# Patient Record
Sex: FEMALE | Race: WHITE | NOT HISPANIC OR LATINO | URBAN - METROPOLITAN AREA
[De-identification: names, ages, dates, MRNs, and addresses within clinical notes are randomized per-mention and may not be internally consistent; named-entity substitution may affect disease eponyms.]

---

## 2017-10-14 ENCOUNTER — EMERGENCY (EMERGENCY)
Facility: HOSPITAL | Age: 22
LOS: 1 days | Discharge: HOME | End: 2017-10-14

## 2017-10-14 DIAGNOSIS — J06.9 ACUTE UPPER RESPIRATORY INFECTION, UNSPECIFIED: ICD-10-CM

## 2017-10-14 DIAGNOSIS — J30.2 OTHER SEASONAL ALLERGIC RHINITIS: ICD-10-CM

## 2017-10-14 DIAGNOSIS — Z3A.01 LESS THAN 8 WEEKS GESTATION OF PREGNANCY: ICD-10-CM

## 2017-10-14 DIAGNOSIS — O99.511 DISEASES OF THE RESPIRATORY SYSTEM COMPLICATING PREGNANCY, FIRST TRIMESTER: ICD-10-CM

## 2018-06-18 ENCOUNTER — INPATIENT (INPATIENT)
Facility: HOSPITAL | Age: 23
LOS: 1 days | Discharge: HOME | End: 2018-06-20
Attending: OBSTETRICS & GYNECOLOGY | Admitting: OBSTETRICS & GYNECOLOGY

## 2018-06-18 VITALS
TEMPERATURE: 103 F | DIASTOLIC BLOOD PRESSURE: 73 MMHG | OXYGEN SATURATION: 97 % | RESPIRATION RATE: 18 BRPM | SYSTOLIC BLOOD PRESSURE: 120 MMHG | HEART RATE: 137 BPM

## 2018-06-18 LAB
ANION GAP SERPL CALC-SCNC: 14 MMOL/L — SIGNIFICANT CHANGE UP (ref 7–14)
APPEARANCE UR: CLEAR — SIGNIFICANT CHANGE UP
BACTERIA # UR AUTO: (no result) /HPF
BASOPHILS # BLD AUTO: 0.03 K/UL — SIGNIFICANT CHANGE UP (ref 0–0.2)
BASOPHILS NFR BLD AUTO: 0.2 % — SIGNIFICANT CHANGE UP (ref 0–1)
BILIRUB UR-MCNC: NEGATIVE — SIGNIFICANT CHANGE UP
BUN SERPL-MCNC: 9 MG/DL — LOW (ref 10–20)
CALCIUM SERPL-MCNC: 9.2 MG/DL — SIGNIFICANT CHANGE UP (ref 8.5–10.1)
CHLORIDE SERPL-SCNC: 98 MMOL/L — SIGNIFICANT CHANGE UP (ref 98–110)
CO2 SERPL-SCNC: 25 MMOL/L — SIGNIFICANT CHANGE UP (ref 17–32)
COLOR SPEC: YELLOW — SIGNIFICANT CHANGE UP
CREAT SERPL-MCNC: 0.8 MG/DL — SIGNIFICANT CHANGE UP (ref 0.7–1.5)
DIFF PNL FLD: (no result)
EOSINOPHIL # BLD AUTO: 0.16 K/UL — SIGNIFICANT CHANGE UP (ref 0–0.7)
EOSINOPHIL NFR BLD AUTO: 1.2 % — SIGNIFICANT CHANGE UP (ref 0–8)
EPI CELLS # UR: (no result) /HPF
GLUCOSE SERPL-MCNC: 113 MG/DL — HIGH (ref 70–99)
GLUCOSE UR QL: NEGATIVE MG/DL — SIGNIFICANT CHANGE UP
HCT VFR BLD CALC: 38.1 % — SIGNIFICANT CHANGE UP (ref 37–47)
HGB BLD-MCNC: 11.8 G/DL — LOW (ref 12–16)
IMM GRANULOCYTES NFR BLD AUTO: 0.3 % — SIGNIFICANT CHANGE UP (ref 0.1–0.3)
KETONES UR-MCNC: NEGATIVE — SIGNIFICANT CHANGE UP
LACTATE SERPL-SCNC: 1.2 MMOL/L — SIGNIFICANT CHANGE UP (ref 0.5–2.2)
LEUKOCYTE ESTERASE UR-ACNC: (no result)
LYMPHOCYTES # BLD AUTO: 0.88 K/UL — LOW (ref 1.2–3.4)
LYMPHOCYTES # BLD AUTO: 6.4 % — LOW (ref 20.5–51.1)
MCHC RBC-ENTMCNC: 23.2 PG — LOW (ref 27–31)
MCHC RBC-ENTMCNC: 31 G/DL — LOW (ref 32–37)
MCV RBC AUTO: 74.9 FL — LOW (ref 81–99)
MONOCYTES # BLD AUTO: 0.43 K/UL — SIGNIFICANT CHANGE UP (ref 0.1–0.6)
MONOCYTES NFR BLD AUTO: 3.1 % — SIGNIFICANT CHANGE UP (ref 1.7–9.3)
NEUTROPHILS # BLD AUTO: 12.14 K/UL — HIGH (ref 1.4–6.5)
NEUTROPHILS NFR BLD AUTO: 88.8 % — HIGH (ref 42.2–75.2)
NITRITE UR-MCNC: NEGATIVE — SIGNIFICANT CHANGE UP
NRBC # BLD: 0 /100 WBCS — SIGNIFICANT CHANGE UP (ref 0–0)
PH UR: 6.5 — SIGNIFICANT CHANGE UP (ref 5–8)
PLATELET # BLD AUTO: 266 K/UL — SIGNIFICANT CHANGE UP (ref 130–400)
POTASSIUM SERPL-MCNC: 3.9 MMOL/L — SIGNIFICANT CHANGE UP (ref 3.5–5)
POTASSIUM SERPL-SCNC: 3.9 MMOL/L — SIGNIFICANT CHANGE UP (ref 3.5–5)
PROT UR-MCNC: NEGATIVE MG/DL — SIGNIFICANT CHANGE UP
RBC # BLD: 5.09 M/UL — SIGNIFICANT CHANGE UP (ref 4.2–5.4)
RBC # FLD: 15.6 % — HIGH (ref 11.5–14.5)
RBC CASTS # UR COMP ASSIST: (no result) /HPF
SODIUM SERPL-SCNC: 137 MMOL/L — SIGNIFICANT CHANGE UP (ref 135–146)
SP GR SPEC: 1.01 — SIGNIFICANT CHANGE UP (ref 1.01–1.03)
UROBILINOGEN FLD QL: 0.2 MG/DL — SIGNIFICANT CHANGE UP (ref 0.2–0.2)
WBC # BLD: 13.68 K/UL — HIGH (ref 4.8–10.8)
WBC # FLD AUTO: 13.68 K/UL — HIGH (ref 4.8–10.8)
WBC UR QL: (no result) /HPF

## 2018-06-18 RX ORDER — AMPICILLIN TRIHYDRATE 250 MG
2 CAPSULE ORAL EVERY 6 HOURS
Qty: 0 | Refills: 0 | Status: DISCONTINUED | OUTPATIENT
Start: 2018-06-18 | End: 2018-06-20

## 2018-06-18 RX ORDER — GENTAMICIN SULFATE 40 MG/ML
80 VIAL (ML) INJECTION EVERY 8 HOURS
Qty: 0 | Refills: 0 | Status: DISCONTINUED | OUTPATIENT
Start: 2018-06-18 | End: 2018-06-20

## 2018-06-18 RX ORDER — SODIUM CHLORIDE 9 MG/ML
1000 INJECTION INTRAMUSCULAR; INTRAVENOUS; SUBCUTANEOUS ONCE
Qty: 0 | Refills: 0 | Status: COMPLETED | OUTPATIENT
Start: 2018-06-18 | End: 2018-06-18

## 2018-06-18 RX ORDER — IBUPROFEN 200 MG
600 TABLET ORAL EVERY 6 HOURS
Qty: 0 | Refills: 0 | Status: DISCONTINUED | OUTPATIENT
Start: 2018-06-18 | End: 2018-06-20

## 2018-06-18 RX ORDER — ACETAMINOPHEN 500 MG
650 TABLET ORAL EVERY 6 HOURS
Qty: 0 | Refills: 0 | Status: DISCONTINUED | OUTPATIENT
Start: 2018-06-18 | End: 2018-06-20

## 2018-06-18 RX ADMIN — Medication 100 MILLIGRAM(S): at 18:01

## 2018-06-18 RX ADMIN — Medication 100 MILLIGRAM(S): at 22:00

## 2018-06-18 RX ADMIN — Medication 650 MILLIGRAM(S): at 12:35

## 2018-06-18 RX ADMIN — Medication 216 GRAM(S): at 18:01

## 2018-06-18 RX ADMIN — Medication 650 MILLIGRAM(S): at 22:03

## 2018-06-18 RX ADMIN — SODIUM CHLORIDE 1000 MILLILITER(S): 9 INJECTION INTRAMUSCULAR; INTRAVENOUS; SUBCUTANEOUS at 13:27

## 2018-06-18 RX ADMIN — SODIUM CHLORIDE 1000 MILLILITER(S): 9 INJECTION INTRAMUSCULAR; INTRAVENOUS; SUBCUTANEOUS at 14:57

## 2018-06-18 RX ADMIN — Medication 100 MILLIGRAM(S): at 23:41

## 2018-06-18 NOTE — H&P ADULT - NSHPPHYSICALEXAM_GEN_ALL_CORE
Vital Signs Last 24 Hrs  T(C): 37 (18 Jun 2018 14:26), Max: 39.3 (18 Jun 2018 12:26)  T(F): 98.6 (18 Jun 2018 14:26), Max: 102.7 (18 Jun 2018 12:26)  HR: 98 (18 Jun 2018 14:26) (98 - 137)  BP: 109/59 (18 Jun 2018 14:26) (109/59 - 120/73)  RR: 18 (18 Jun 2018 14:26) (18 - 18)  SpO2: 97% (18 Jun 2018 14:26) (97% - 97%)    PHYSICAL EXAM:  GEN: NAD, AAOX3  breasts: bilateral tenderness, no engorgement or erythema, no nipple cracks  CVS: RRR, normal S1/S2  lungs: CTAB  abd: soft, nontender, no r/g/r  SVE: normal external genitalia, no vaginal bleeding or discharge, closed cervix, no CMT, anteverted uterus 8 cm in size, mild fundal tenderness, no adnexal masses or tenderness.   ext: no calf tenderness or edema

## 2018-06-18 NOTE — H&P ADULT - NSHPLABSRESULTS_GEN_ALL_CORE
11.8   13.68 )-----------( 266      ( 2018 13:09 )             38.1       06-18    137  |  98  |  9<L>  ----------------------------<  113<H>  3.9   |  25  |  0.8    Ca    9.2      2018 13:09        Urinalysis Basic - ( 2018 13:55 )    Color: Yellow / Appearance: Clear / S.010 / pH: x  Gluc: x / Ketone: Negative  / Bili: Negative / Urobili: 0.2 mg/dL   Blood: x / Protein: Negative mg/dL / Nitrite: Negative   Leuk Esterase: Large / RBC: 2-5 /HPF / WBC 10-25 /HPF   Sq Epi: x / Non Sq Epi: Few /HPF / Bacteria: Few /HPF    CXR negative

## 2018-06-18 NOTE — ED PROVIDER NOTE - NS ED ROS FT
CONST: +Fever and body aches  EYES: No pain, redness, drainage or visual changes.  ENT: No ear pain or discharge, nasal discharge or congestion. No sore throat  CARD: No chest pain, palpitations  RESP: No SOB, cough, hemoptysis. No hx of asthma or COPD  BREAST: +BL breast pain  GI: No abdominal pain, N/V/D  MS: No joint pain, back pain or extremity pain/injury  SKIN: No rashes  NEURO: No headache, dizziness, paresthesias or LOC

## 2018-06-18 NOTE — H&P ADULT - HISTORY OF PRESENT ILLNESS
HPI: 22 yo P1 s/p  on 18 at [facility] with Dr. Lyman presents to [facility] with subjective fevers of 102 and body aches for 24 hours. Patient indicates she was not feeling well and starting having chills yesterday a/w body aches that worsened and today felt warm and temperature taken at home was 104. She did not take any medications. For the last 1 weeks she has had intermittent dysuria when urinarting, denies urgency or frequency. Denies nausea, vomiting, diarrhea, confusion, CP, SOB, abdominal pain, vaginal discharge. Has some vaginal spotting, non foul smelling. Denies sick contacts. She also c/o bilateral breast pain that started since the vaginal delivery that worsened yesterday, worse on the left breast than right, a/w swelling, but no nipple discharge, no nipple bleeding, or focal tenderness. She reports breast pumping q5-8 hours, not breast pumping. Since being in the ED, she received tylenol and IV fluids and has improved, afebrile at this time. Patient had not complications, antenatally, intrapatum and postpartum until now.     Gyn: denies history of abnormal pap, STI, ovarian cysts, or uterine fibroids  Obstetric: P1, male, 7lbs 7 ounces HPI: 24 yo P1 s/p  on 18 at [facility] with Dr. Lyman presents to [facility] with fevers of 102 and body aches for 24 hours. Patient indicates she was not feeling well and starting having chills yesterday a/w body aches that worsened and today felt warm and temperature taken at home was 104. She did not take any medications. For the last 1 weeks she has had intermittent dysuria when urinarting, denies urgency or frequency. Denies nausea, vomiting, diarrhea, confusion, CP, SOB, abdominal pain, vaginal discharge. Has some vaginal spotting, non foul smelling. Denies sick contacts. She also c/o bilateral breast pain that started since the vaginal delivery that worsened yesterday, worse on the left breast than right, a/w swelling, but no nipple discharge, no nipple bleeding, or focal tenderness. She reports breast pumping q5-8 hours, not breast feeding. Since being in the ED, she received tylenol and IV fluids and has improved, afebrile at this time. Patient had not complications, antenatally, intrapatum and postpartum until now.     Gyn: denies history of abnormal pap, STI, ovarian cysts, or uterine fibroids  Obstetric: P1, male, 7lbs 7 ounces

## 2018-06-18 NOTE — ED PROVIDER NOTE - OBJECTIVE STATEMENT
Pt is a 22 yo F c/o fever tmax of 102 at home x 1 day with BL breast tenderness. Pt is 2 weeks post partem and has been breast feeding but sts both breasts  became very painful, denies any redness or purulent drainage from the nipples. Pt also c/o generalized body aches and back pain, reports she had epidural prior to delivery but denies localized spinal pain. Also c/o intermittent burning with urination, no hematuria. Reports mild epigastric pain as well, sts it feels more "sore" than painful. No N/V/D. Also has mild dry cough, no SOB. No headache, dizziness, sore throat, ear pain.

## 2018-06-18 NOTE — ED PROVIDER NOTE - CARE PLAN
Principal Discharge DX:	Endometritis Principal Discharge DX:	Endometritis  Secondary Diagnosis:	Sepsis

## 2018-06-18 NOTE — ED PROVIDER NOTE - PROGRESS NOTE DETAILS
GYN came down to evaluate patient, Dr Reeves at bedside. Pt requesting female GYN to do pelvic exam all fo which are delivering in L&D right now. They will come back in about an hour to examine her. Pt aware of this. Sepsis suspected at this time.   intial abx delayed, unclear source. pt seen by gyn, unclear if true UTI. possible endometritis. pt admitted for further eval

## 2018-06-18 NOTE — CONSULT NOTE ADULT - SUBJECTIVE AND OBJECTIVE BOX
CHERY YA   23y   Female   2051    Chief Complaint:    HPI:      PAST MEDICAL & SURGICAL HISTORY:  No pertinent past medical history  No significant past surgical history      OB/GYN HISTORY:     LMP: Last Menstrual Period    Cycle Length:              Days Flow:   Gyn: denies history of abnormal pap, STI, ovarian cysts, or uterine fibroids  Obstetric:   Last Pap smear:   2052    Last mammogram:  Last Colonoscopy:    FAMILY HISTORY:      SOCIAL HISTORY: Denies cigarette use, alcohol, or other drugs  ALLERGIES: No Known Allergies    MEDICATIONS:   acetaminophen   Tablet   650 milliGRAM(s) Oral (18 @ 12:35)    sodium chloride 0.9% Bolus   1000 mL/Hr IV Bolus (18 @ 13:27)    sodium chloride 0.9% Bolus   1000 mL/Hr IV Bolus (18 @ 14:57)        REVIEW OF SYSTEMS:    CONSTITUTIONAL: No weakness, fevers or chills  EYES/ENT: No visual changes;  No vertigo or throat pain   NECK: No pain or stiffness  RESPIRATORY: No cough, wheezing, hemoptysis; No shortness of breath  CARDIOVASCULAR: No chest pain or palpitations  GASTROINTESTINAL: No abdominal or epigastric pain. No nausea, vomiting, or hematemesis; No diarrhea or constipation. No melena or hematochezia.  GENITOURINARY: No dysuria, frequency or hematuria  NEUROLOGICAL: No numbness or weakness  SKIN: No itching, rashes  All other negative      Vital Signs Last 24 Hrs  T(C): 37 (2018 14:26), Max: 39.3 (2018 12:26)  T(F): 98.6 (2018 14:26), Max: 102.7 (2018 12:26)  HR: 98 (2018 14:26) (98 - 137)  BP: 109/59 (2018 14:26) (109/59 - 120/73)  BP(mean): --  RR: 18 (2018 14:26) (18 - 18)  SpO2: 97% (2018 14:26) (97% - 97%)    PHYSICAL EXAM:      Constitutional: AAOx3, NAD    Breasts: Normal    Respiratory: CTAB    Cardiovascular: NL S1/S2, no M/R/G    Gastrointestinal: Soft, nontender, nondistended, no rebound, guarding, or rigidity    Pelvic: Normal vulva, normal vagina, no bleeding, Cervix normal, closed, no bleeding, no abnormal discharge, Uterus anteverted, normal sized, no fundal tenderness, no adnexal masses or tenderness    Rectal:    LABS:                        11.8   13.68 )-----------( 266      ( 2018 13:09 )             38.1         06-18    137  |  98  |  9<L>  ----------------------------<  113<H>  3.9   |  25  |  0.8    Ca    9.2      2018 13:09        Urinalysis Basic - ( 2018 13:55 )    Color: Yellow / Appearance: Clear / S.010 / pH: x  Gluc: x / Ketone: Negative  / Bili: Negative / Urobili: 0.2 mg/dL   Blood: x / Protein: Negative mg/dL / Nitrite: Negative   Leuk Esterase: Large / RBC: 2-5 /HPF / WBC 10-25 /HPF   Sq Epi: x / Non Sq Epi: Few /HPF / Bacteria: Few /HPF          RADIOLOGY & ADDITIONAL STUDIES: Chief Complaint: BOdyaches and temperature s/p vaginal delivery    HPI: 24 yo P1 s/p  on 18 at [facility] with Dr. Lyman presents to [facility] with subjective fevers of 102 and body aches for 24 hours. Patient indicates she was not feeling well and starting having chills yesterday a/w body aches that worsened and today felt warm and temperature taken at home was 104. She did not take any medications. For the last 1 weeks she has had intermittent dysuria when urinarting, denies urgency or frequency. Denies nausea, vomiting, diarrhea, confusion, CP, SOB, abdominal pain. Denies sick contacts. She also c/o bilateral breast pain that started since the vaginal delivery that worsened yesterday, worse on the left breast than right, a/w swelling, but no nipple discharge, no nipple bleeding, or focal tenderness. She reports breast pumping q5-8 hours, not breast pumping. Since being in the ED, she received tylenol and IV fluids and has improved, afebrile at this time. Patient had not complications, antenatally, intrapatum and postpartum until now.     PAST MEDICAL & SURGICAL HISTORY:  No pertinent past medical history  No significant past surgical history    OB/GYN HISTORY:     Gyn: denies history of abnormal pap, STI, ovarian cysts, or uterine fibroids  Obstetric: P1, male, 7lbs 7 ounces    FAMILY HISTORY: no pertinent family history    SOCIAL HISTORY: Denies cigarette use, alcohol, or other drugs    ALLERGIES: No Known Allergies    MEDICATIONS:   acetaminophen   Tablet   650 milliGRAM(s) Oral (18 @ 12:35)    sodium chloride 0.9% Bolus x2      REVIEW OF SYSTEMS: All negative unless mentioned in HPI    Vital Signs Last 24 Hrs  T(C): 37 (2018 14:26), Max: 39.3 (2018 12:26)  T(F): 98.6 (2018 14:26), Max: 102.7 (2018 12:26)  HR: 98 (2018 14:26) (98 - 137)  BP: 109/59 (2018 14:26) (109/59 - 120/73)  RR: 18 (2018 14:26) (18 - 18)  SpO2: 97% (2018 14:26) (97% - 97%)    PHYSICAL EXAM:      LABS:                        11.8   13.68 )-----------( 266      ( 2018 13:09 )             38.1       06-18    137  |  98  |  9<L>  ----------------------------<  113<H>  3.9   |  25  |  0.8    Ca    9.2      2018 13:09        Urinalysis Basic - ( 2018 13:55 )    Color: Yellow / Appearance: Clear / S.010 / pH: x  Gluc: x / Ketone: Negative  / Bili: Negative / Urobili: 0.2 mg/dL   Blood: x / Protein: Negative mg/dL / Nitrite: Negative   Leuk Esterase: Large / RBC: 2-5 /HPF / WBC 10-25 /HPF   Sq Epi: x / Non Sq Epi: Few /HPF / Bacteria: Few /HPF          RADIOLOGY & ADDITIONAL STUDIES: Chief Complaint: BOdyaches and temperature s/p vaginal delivery    HPI: 22 yo P1 s/p  on 18 at [facility] with Dr. Lyman presents to [facility] with subjective fevers of 102 and body aches for 24 hours. Patient indicates she was not feeling well and starting having chills yesterday a/w body aches that worsened and today felt warm and temperature taken at home was 104. She did not take any medications. For the last 1 weeks she has had intermittent dysuria when urinarting, denies urgency or frequency. Denies nausea, vomiting, diarrhea, confusion, CP, SOB, abdominal pain, vaginal discharge. Has some vaginal spotting, non foul smelling. Denies sick contacts. She also c/o bilateral breast pain that started since the vaginal delivery that worsened yesterday, worse on the left breast than right, a/w swelling, but no nipple discharge, no nipple bleeding, or focal tenderness. She reports breast pumping q5-8 hours, not breast pumping. Since being in the ED, she received tylenol and IV fluids and has improved, afebrile at this time. Patient had not complications, antenatally, intrapatum and postpartum until now.     PAST MEDICAL & SURGICAL HISTORY:  No pertinent past medical history  No significant past surgical history    OB/GYN HISTORY:     Gyn: denies history of abnormal pap, STI, ovarian cysts, or uterine fibroids  Obstetric: P1, male, 7lbs 7 ounces    FAMILY HISTORY: no pertinent family history    SOCIAL HISTORY: Denies cigarette use, alcohol, or other drugs    ALLERGIES: No Known Allergies    MEDICATIONS:   acetaminophen   Tablet   650 milliGRAM(s) Oral (18 @ 12:35)    sodium chloride 0.9% Bolus x2      REVIEW OF SYSTEMS: All negative unless mentioned in HPI    Vital Signs Last 24 Hrs  T(C): 37 (2018 14:26), Max: 39.3 (2018 12:26)  T(F): 98.6 (2018 14:26), Max: 102.7 (2018 12:26)  HR: 98 (2018 14:26) (98 - 137)  BP: 109/59 (2018 14:26) (109/59 - 120/73)  RR: 18 (2018 14:26) (18 - 18)  SpO2: 97% (2018 14:26) (97% - 97%)    PHYSICAL EXAM:  GEN: NAD, AAOX3  breasts: bilateral tenderness, no engorgement or erythema, no nipple cracks  CVS: RRR, normal S1/S2  lungs: CTAB  abd: soft, nontender, no r/g/r  SVE: normal external genitalia, no vaginal bleeding or discharge, closed cervix, no CMT, anteverted uterus 8 cm in size nontender, no adnexal masses or tenderness.   ext: no calf tenderness or edema    LABS:                        11.8   13.68 )-----------( 266      ( 2018 13:09 )             38.1       06-18    137  |  98  |  9<L>  ----------------------------<  113<H>  3.9   |  25  |  0.8    Ca    9.2      2018 13:09        Urinalysis Basic - ( 2018 13:55 )    Color: Yellow / Appearance: Clear / S.010 / pH: x  Gluc: x / Ketone: Negative  / Bili: Negative / Urobili: 0.2 mg/dL   Blood: x / Protein: Negative mg/dL / Nitrite: Negative   Leuk Esterase: Large / RBC: 2-5 /HPF / WBC 10-25 /HPF   Sq Epi: x / Non Sq Epi: Few /HPF / Bacteria: Few /HPF          RADIOLOGY & ADDITIONAL STUDIES: Chief Complaint: BOdyaches and temperature s/p vaginal delivery    HPI: 24 yo P1 s/p  on 18 at [facility] with Dr. Lyman presents to [facility] with subjective fevers of 102 and body aches for 24 hours. Patient indicates she was not feeling well and starting having chills yesterday a/w body aches that worsened and today felt warm and temperature taken at home was 104. She did not take any medications. For the last 1 weeks she has had intermittent dysuria when urinarting, denies urgency or frequency. Denies nausea, vomiting, diarrhea, confusion, CP, SOB, abdominal pain, vaginal discharge. Has some vaginal spotting, non foul smelling. Denies sick contacts. She also c/o bilateral breast pain that started since the vaginal delivery that worsened yesterday, worse on the left breast than right, a/w swelling, but no nipple discharge, no nipple bleeding, or focal tenderness. She reports breast pumping q5-8 hours, not breast pumping. Since being in the ED, she received tylenol and IV fluids and has improved, afebrile at this time. Patient had not complications, antenatally, intrapatum and postpartum until now.     PAST MEDICAL & SURGICAL HISTORY:  No pertinent past medical history  No significant past surgical history    OB/GYN HISTORY:     Gyn: denies history of abnormal pap, STI, ovarian cysts, or uterine fibroids  Obstetric: P1, male, 7lbs 7 ounces    FAMILY HISTORY: no pertinent family history    SOCIAL HISTORY: Denies cigarette use, alcohol, or other drugs    ALLERGIES: No Known Allergies    MEDICATIONS:   acetaminophen   Tablet   650 milliGRAM(s) Oral (18 @ 12:35)    sodium chloride 0.9% Bolus x2      REVIEW OF SYSTEMS: All negative unless mentioned in HPI    Vital Signs Last 24 Hrs  T(C): 37 (2018 14:26), Max: 39.3 (2018 12:26)  T(F): 98.6 (2018 14:26), Max: 102.7 (2018 12:26)  HR: 98 (2018 14:26) (98 - 137)  BP: 109/59 (2018 14:26) (109/59 - 120/73)  RR: 18 (2018 14:26) (18 - 18)  SpO2: 97% (2018 14:26) (97% - 97%)    PHYSICAL EXAM:  GEN: NAD, AAOX3  breasts: bilateral tenderness, no engorgement or erythema, no nipple cracks  CVS: RRR, normal S1/S2  lungs: CTAB  abd: soft, nontender, no r/g/r  SVE: normal external genitalia, no vaginal bleeding or discharge, closed cervix, no CMT, anteverted uterus 8 cm in size, mild fundal tenderness, no adnexal masses or tenderness.   ext: no calf tenderness or edema    LABS:                        11.8   13.68 )-----------( 266      ( 2018 13:09 )             38.1       06-18    137  |  98  |  9<L>  ----------------------------<  113<H>  3.9   |  25  |  0.8    Ca    9.2      2018 13:09        Urinalysis Basic - ( 2018 13:55 )    Color: Yellow / Appearance: Clear / S.010 / pH: x  Gluc: x / Ketone: Negative  / Bili: Negative / Urobili: 0.2 mg/dL   Blood: x / Protein: Negative mg/dL / Nitrite: Negative   Leuk Esterase: Large / RBC: 2-5 /HPF / WBC 10-25 /HPF   Sq Epi: x / Non Sq Epi: Few /HPF / Bacteria: Few /HPF          RADIOLOGY & ADDITIONAL STUDIES:

## 2018-06-18 NOTE — ED PROVIDER NOTE - ATTENDING CONTRIBUTION TO CARE
23yr old female s/p vaginal delivery 2 weeks ago here for eval of fever. pt with fever since last night, tmax, 104, associated with body aches. mild bleeding, no foul odor, no localized back pain, no abdominal pain, cough, sore throat. pt on exam no distress, ( I examined pt after fever gone) s1s2 ctab soft nt/nd, no cva ttp, neck supple, no midline spinal ttp. neuro grossly unremarkable impression fever, recent child birth, + uti, pt pending GYN consult

## 2018-06-18 NOTE — H&P ADULT - ASSESSMENT
22 y/o, P1, s/p vaginal delivery, 2 weeks postpartum, with fever without definite source, possible endometritis. Admitted for IV antibiotic treatment.   - admit to gyn  - ampicillin, gentamycin and clindamycin IV until 24 hours aftebrile  - f/u temps  - f/u Ucx, blood cx, vaginal cx, GC/Cl  - pain management PRN  - patient encouraged to breast pump frequently    Dr. Reeves and Dr. Edgar aware. 24 y/o, P1, s/p vaginal delivery, 2 weeks postpartum, with persumed endometritis. Admitted for IV antibiotic treatment.   - admit to gyn  - ampicillin, gentamycin and clindamycin IV until 24 hours aftebrile  - f/u temps  - f/u Ucx, blood cx, vaginal cx, GC/Cl  - pain management PRN  - patient encouraged to breast pump frequently    Dr. Reeves and Dr. Edgar aware.

## 2018-06-18 NOTE — ED PROVIDER NOTE - PHYSICAL EXAMINATION
CONST: Well appearing in NAD  EYES: PERRL, EOMI, Sclera and conjunctiva clear.   ENT: No nasal discharge. TM's clear B/L without drainage. Oropharynx normal appearing, no erythema or exudates. Uvula midline.  NECK: Non-tender, no meningeal signs  CARD: Normal S1 S2; Normal rate and rhythm  RESP: Equal BS B/L, No wheezes, rhonchi or rales. No distress  GI: Soft, non-distended. Mild epigastric tenderness, no pelvic tenderness.  BREAST: BL breasts mildly swollen and tender to palpation. No definitive abscess noted, no nipple drainage. No erythema, warmth to touch or skin dimpling. No fissures or lacerations of nipples.  MS: Normal ROM in all extremities. No midline spinal tenderness. No CVA tenderness.  SKIN: Warm, dry, no acute rashes. Good turgor  NEURO: A&Ox3, No focal deficits. Strength 5/5 with no sensory deficits. Steady gait

## 2018-06-19 LAB
ANION GAP SERPL CALC-SCNC: 15 MMOL/L — HIGH (ref 7–14)
ANION GAP SERPL CALC-SCNC: 15 MMOL/L — HIGH (ref 7–14)
BUN SERPL-MCNC: 5 MG/DL — LOW (ref 10–20)
BUN SERPL-MCNC: 5 MG/DL — LOW (ref 10–20)
C TRACH RRNA SPEC QL NAA+PROBE: SIGNIFICANT CHANGE UP
CALCIUM SERPL-MCNC: 8.7 MG/DL — SIGNIFICANT CHANGE UP (ref 8.5–10.1)
CALCIUM SERPL-MCNC: 9.3 MG/DL — SIGNIFICANT CHANGE UP (ref 8.5–10.1)
CHLORIDE SERPL-SCNC: 100 MMOL/L — SIGNIFICANT CHANGE UP (ref 98–110)
CHLORIDE SERPL-SCNC: 103 MMOL/L — SIGNIFICANT CHANGE UP (ref 98–110)
CO2 SERPL-SCNC: 21 MMOL/L — SIGNIFICANT CHANGE UP (ref 17–32)
CO2 SERPL-SCNC: 28 MMOL/L — SIGNIFICANT CHANGE UP (ref 17–32)
CREAT SERPL-MCNC: 0.6 MG/DL — LOW (ref 0.7–1.5)
CREAT SERPL-MCNC: 0.6 MG/DL — LOW (ref 0.7–1.5)
CULTURE RESULTS: NO GROWTH — SIGNIFICANT CHANGE UP
GLUCOSE SERPL-MCNC: 112 MG/DL — HIGH (ref 70–99)
GLUCOSE SERPL-MCNC: 97 MG/DL — SIGNIFICANT CHANGE UP (ref 70–99)
HCT VFR BLD CALC: 34.7 % — LOW (ref 37–47)
HGB BLD-MCNC: 10.5 G/DL — LOW (ref 12–16)
MCHC RBC-ENTMCNC: 23 PG — LOW (ref 27–31)
MCHC RBC-ENTMCNC: 30.3 G/DL — LOW (ref 32–37)
MCV RBC AUTO: 76.1 FL — LOW (ref 81–99)
N GONORRHOEA RRNA SPEC QL NAA+PROBE: SIGNIFICANT CHANGE UP
NRBC # BLD: 0 /100 WBCS — SIGNIFICANT CHANGE UP (ref 0–0)
PLATELET # BLD AUTO: 240 K/UL — SIGNIFICANT CHANGE UP (ref 130–400)
POTASSIUM SERPL-MCNC: 4.5 MMOL/L — SIGNIFICANT CHANGE UP (ref 3.5–5)
POTASSIUM SERPL-MCNC: 7.8 MMOL/L — CRITICAL HIGH (ref 3.5–5)
POTASSIUM SERPL-SCNC: 4.5 MMOL/L — SIGNIFICANT CHANGE UP (ref 3.5–5)
POTASSIUM SERPL-SCNC: 7.8 MMOL/L — CRITICAL HIGH (ref 3.5–5)
RBC # BLD: 4.56 M/UL — SIGNIFICANT CHANGE UP (ref 4.2–5.4)
RBC # FLD: 15.9 % — HIGH (ref 11.5–14.5)
SODIUM SERPL-SCNC: 139 MMOL/L — SIGNIFICANT CHANGE UP (ref 135–146)
SODIUM SERPL-SCNC: 143 MMOL/L — SIGNIFICANT CHANGE UP (ref 135–146)
SPECIMEN SOURCE: SIGNIFICANT CHANGE UP
SPECIMEN SOURCE: SIGNIFICANT CHANGE UP
WBC # BLD: 11.32 K/UL — HIGH (ref 4.8–10.8)
WBC # FLD AUTO: 11.32 K/UL — HIGH (ref 4.8–10.8)

## 2018-06-19 RX ORDER — DOCOSANOL 100 MG/G
1 CREAM TOPICAL
Qty: 0 | Refills: 0 | Status: DISCONTINUED | OUTPATIENT
Start: 2018-06-19 | End: 2018-06-20

## 2018-06-19 RX ORDER — ACYCLOVIR 50 MG/G
1 OINTMENT TOPICAL EVERY 6 HOURS
Qty: 0 | Refills: 0 | Status: DISCONTINUED | OUTPATIENT
Start: 2018-06-19 | End: 2018-06-19

## 2018-06-19 RX ADMIN — DOCOSANOL 1 APPLICATION(S): 100 CREAM TOPICAL at 17:14

## 2018-06-19 RX ADMIN — Medication 100 MILLIGRAM(S): at 15:16

## 2018-06-19 RX ADMIN — Medication 216 GRAM(S): at 12:31

## 2018-06-19 RX ADMIN — Medication 100 MILLIGRAM(S): at 14:14

## 2018-06-19 RX ADMIN — Medication 216 GRAM(S): at 23:41

## 2018-06-19 RX ADMIN — Medication 100 MILLIGRAM(S): at 21:41

## 2018-06-19 RX ADMIN — Medication 100 MILLIGRAM(S): at 05:05

## 2018-06-19 RX ADMIN — Medication 216 GRAM(S): at 17:16

## 2018-06-19 RX ADMIN — Medication 100 MILLIGRAM(S): at 21:02

## 2018-06-19 RX ADMIN — Medication 216 GRAM(S): at 00:30

## 2018-06-19 RX ADMIN — Medication 100 MILLIGRAM(S): at 05:47

## 2018-06-19 RX ADMIN — DOCOSANOL 1 APPLICATION(S): 100 CREAM TOPICAL at 20:29

## 2018-06-19 RX ADMIN — Medication 216 GRAM(S): at 07:31

## 2018-06-19 RX ADMIN — DOCOSANOL 1 APPLICATION(S): 100 CREAM TOPICAL at 23:41

## 2018-06-19 NOTE — PROGRESS NOTE ADULT - SUBJECTIVE AND OBJECTIVE BOX
PGY1 note    Patient feeling better today. Reports less breast pain and no engorgement. Dysuria has resolved. Body aches also improved. Had one fever overnight of 102F. Denies chest pain, SOB, n/v, abdominal pain, dysuria, diarrhea. Tried breat pumping last night with only small amount of milk expressed.    PE:  Vital Signs Last 24 Hrs  T(C): 36.2 (19 Jun 2018 01:30), Max: 39.3 (18 Jun 2018 12:26)  T(F): 97.1 (19 Jun 2018 01:30), Max: 102.7 (18 Jun 2018 12:26)  HR: 87 (19 Jun 2018 01:30) (87 - 137)  BP: 105/49 (19 Jun 2018 01:30) (105/49 - 120/73)  RR: 19 (19 Jun 2018 01:30) (18 - 19)  SpO2: 98% (19 Jun 2018 00:07) (97% - 98%)    GEN: NAD, AAOX3  breasts: bilateral breast tenderness, no engorgement, no erythema  lungs: CTAB  CVS: RRR, normal S1/S2  abd: soft, mild upper abdominal tenderness, no r/g/r  lochia: no bleeding or discharge  ext: no calf tenderness or edema    labs: none new    meds:  ampicillin 2gr q6h  gentamycin 80 mg q8h  clindamycin 900 mg q8h  tylenol and motrin PRN    A/P:  24 y/o, P1, s/p vaginal delivery, 2 weeks postpartum, with presumed endometritis, on triple IV antibiotic treatment.   - f/u cultures (vaginal, blood, urine)  - AM labs  - f/u temps  - c/w antibiotics  - bring patient electric breast pump today    Dr. Osullivan and Dr. Edgar to be made aware.

## 2018-06-20 ENCOUNTER — TRANSCRIPTION ENCOUNTER (OUTPATIENT)
Age: 23
End: 2018-06-20

## 2018-06-20 VITALS
SYSTOLIC BLOOD PRESSURE: 97 MMHG | HEART RATE: 68 BPM | TEMPERATURE: 100 F | RESPIRATION RATE: 20 BRPM | DIASTOLIC BLOOD PRESSURE: 56 MMHG

## 2018-06-20 RX ORDER — IBUPROFEN 200 MG
1 TABLET ORAL
Qty: 0 | Refills: 0 | COMMUNITY
Start: 2018-06-20

## 2018-06-20 RX ORDER — DOCOSANOL 100 MG/G
1 CREAM TOPICAL
Qty: 0 | Refills: 0 | COMMUNITY
Start: 2018-06-20

## 2018-06-20 RX ORDER — IBUPROFEN 200 MG
1 TABLET ORAL
Qty: 56 | Refills: 0 | OUTPATIENT
Start: 2018-06-20 | End: 2018-07-03

## 2018-06-20 RX ORDER — CEPHALEXIN 500 MG
1 CAPSULE ORAL
Qty: 10 | Refills: 0 | OUTPATIENT
Start: 2018-06-20 | End: 2018-06-24

## 2018-06-20 RX ORDER — ACETAMINOPHEN 500 MG
2 TABLET ORAL
Qty: 0 | Refills: 0 | COMMUNITY
Start: 2018-06-20

## 2018-06-20 RX ADMIN — Medication 100 MILLIGRAM(S): at 06:02

## 2018-06-20 RX ADMIN — Medication 216 GRAM(S): at 05:11

## 2018-06-20 RX ADMIN — Medication 100 MILLIGRAM(S): at 06:35

## 2018-06-20 NOTE — PROGRESS NOTE ADULT - SUBJECTIVE AND OBJECTIVE BOX
GYN PGY 4 Progress Note    Subjective: pt seen and evaluated at bedside, not acute overnight events, afebrile at this time. Ambulating. Tolerating PO. no nausea or vomiting. Denies CP, SOB, confusion, dizziness, or abdominal pain. Voided. Oral herpes non tender and not worsening, better with cream. No breast tenderness at this time, using pump as instructed    Physical exam:    Vital Signs Last 24 Hrs  T(F): 96.6 (20 Jun 2018 04:00), Max: 98.8 (19 Jun 2018 09:49)  HR: 59 (20 Jun 2018 04:00) (59 - 88)  BP: 90/53 (20 Jun 2018 04:00) (90/53 - 109/58)  RR: 20 (20 Jun 2018 04:00) (18 - 20)    Gen: NAD, AAOx3  CVS: S1S2, RRR  Lungs: Lungs, Ctab, no rhonchi or rales  Abdomen: Soft, nontender, no distension   Pelvic: deferred  Ext: No calf tenderness    Diet:  meds:   ampicillin  IVPB  clindamycin IVPB  docosanol 10% Cream  gentamicin   IVPB      LABS:                        10.5   11.32 )-----------( 240      ( 19 Jun 2018 11:43 )             34.7                         11.8   13.68 )-----------( 266      ( 18 Jun 2018 13:09 )             38.1       06-19-18 @ 15:30      143  |  100  |  5<L>  ----------------------------<  112<H>  4.5   |  28  |  0.6<L>    06-19-18 @ 11:43      139  |  103  |  5<L>  ----------------------------<  97  7.8<HH>   |  21  |  0.6<L>    06-18-18 @ 13:09      137  |  98  |  9<L>  ----------------------------<  113<H>  3.9   |  25  |  0.8        Ca    9.3      19 Jun 2018 15:30  Ca    8.7      19 Jun 2018 11:43  Ca    9.2      18 Jun 2018 13:09      Culture - Urine (collected 06-18-18 @ 13:55)  Source: .Urine Clean Catch (Midstream)  Final Report (06-19-18 @ 22:58):    No growth    Culture - Blood (collected 06-18-18 @ 13:09)  Source: .Blood Blood  Preliminary Report (06-20-18 @ 01:02):    No growth to date.

## 2018-06-20 NOTE — PROGRESS NOTE ADULT - ASSESSMENT
22 y/o, P1, s/p vaginal delivery, 2 weeks postpartum, with presumed endometritis, on triple IV antibiotic treatment, now afebrile 24 hours. With oral herpes on cream. Doing well   - f/u cultures (vaginal, blood, urine)  - discharge home  - c/w  electric breast pump today and q2-3 hours  - instructions given for follow up

## 2018-06-20 NOTE — DISCHARGE NOTE ADULT - CARE PLAN
Principal Discharge DX:	Endometritis  Goal:	healthy woman  Assessment and plan of treatment:	nothing in the vagina for 6 weeks, no tampons, no douching, no baths, no sex. Showers are fine.   Go to the emergency room if you have a temperature greater than 100.4, worsening abdominal pain or increased vaginal bleeding

## 2018-06-20 NOTE — DISCHARGE NOTE ADULT - CARE PROVIDER_API CALL
Fernando Mendenhall), Obstetrics and Gynecology  [address] [place] [address]  Phone: [phone]  Fax: [phone]

## 2018-06-20 NOTE — DISCHARGE NOTE ADULT - MEDICATION SUMMARY - MEDICATIONS TO TAKE
I will START or STAY ON the medications listed below when I get home from the hospital:    ibuprofen 600 mg oral tablet  -- 1 tab(s) by mouth every 6 hours, As needed, pain  -- Indication: For pain    acetaminophen 325 mg oral tablet  -- 2 tab(s) by mouth every 6 hours, As needed, For Temp greater than 38.5 C (101.3 F)  -- Indication: For fever    ibuprofen 600 mg oral tablet  -- 1 tab(s) by mouth every 6 hours, As needed, pain  -- Indication: For pain    cephalexin 500 mg oral capsule  -- 1 cap(s) by mouth 2 times a day   -- Finish all this medication unless otherwise directed by prescriber.    -- Indication: For outpatient antibitoics    docosanol 10% topical cream  -- 1 application on skin 5 times a day  -- Indication: For herpetic lesion

## 2018-06-20 NOTE — DISCHARGE NOTE ADULT - PLAN OF CARE
healthy woman nothing in the vagina for 6 weeks, no tampons, no douching, no baths, no sex. Showers are fine.   Go to the emergency room if you have a temperature greater than 100.4, worsening abdominal pain or increased vaginal bleeding

## 2018-06-20 NOTE — DISCHARGE NOTE ADULT - HOSPITAL COURSE
DATE OF DISCHARGE: 18 @ 07:36    HISTORY OF PRESENT ILLNESS: HPI:  HPI: 24 yo P1 s/p  on 18 at [facility] with Dr. Lyman presents to [facility] with fevers of 102 and body aches for 24 hours. Patient indicates she was not feeling well and starting having chills yesterday a/w body aches that worsened and today felt warm and temperature taken at home was 104. She did not take any medications. For the last 1 weeks she has had intermittent dysuria when urinarting, denies urgency or frequency. Denies nausea, vomiting, diarrhea, confusion, CP, SOB, abdominal pain, vaginal discharge. Has some vaginal spotting, non foul smelling. Denies sick contacts. She also c/o bilateral breast pain that started since the vaginal delivery that worsened yesterday, worse on the left breast than right, a/w swelling, but no nipple discharge, no nipple bleeding, or focal tenderness. She reports breast pumping q5-8 hours, not breast feeding. Since being in the ED, she received tylenol and IV fluids and has improved, afebrile at this time. Patient had not complications, antenatally, intrapatum and postpartum until now.     Gyn: denies history of abnormal pap, STI, ovarian cysts, or uterine fibroids  Obstetric: P1, male, 7lbs 7 ounces (2018 17:44)      PAST MEDICAL & SURGICAL HISTORY:  No pertinent past medical history  No significant past surgical history    Hospital course: started on ampicillin/gentamicin/clindamycin, had febrile episode on HD 1 of 102.7, kept in house for further observation, found to be afebrile on HD 3; treated angel lewis for oral herpetic outbreak. Doing well overall. Discharged home with motrin and keflex.     LABS:                        10.5   11.32 )-----------( 240      ( 2018 11:43 )             34.7                         11.8   13.68 )-----------( 266      ( 2018 13:09 )             38.1       18 @ 15:30      143  |  100  |  5<L>  ----------------------------<  112<H>  4.5   |  28  |  0.6<L>    18 @ 11:43      139  |  103  |  5<L>  ----------------------------<  97  7.8<HH>   |  21  |  0.6<L>    18 @ 13:09      137  |  98  |  9<L>  ----------------------------<  113<H>  3.9   |  25  |  0.8        Ca    9.3      2018 15:30  Ca    8.7      2018 11:43  Ca    9.2      2018 13:09      Culture - Urine (collected 18 @ 13:55)  Source: .Urine Clean Catch (Midstream)  Final Report (18 @ 22:58):    No growth    Culture - Blood (collected 18 @ 13:09)  Source: .Blood Blood  Preliminary Report (18 @ 01:02):    No growth to date.            MEDICATIONS  (STANDING):  ampicillin  IVPB 2 Gram(s) IV Intermittent every 6 hours  clindamycin IVPB 900 milliGRAM(s) IV Intermittent every 8 hours  docosanol 10% Cream 1 Application(s) Topical five times a day  gentamicin   IVPB 80 milliGRAM(s) IV Intermittent every 8 hours    MEDICATIONS  (PRN):  acetaminophen   Tablet 650 milliGRAM(s) Oral every 6 hours PRN For Temp greater than 38.5 C (101.3 F)  acetaminophen   Tablet 650 milliGRAM(s) Oral every 6 hours PRN For Temp greater than 38 C (100.4 F)  ibuprofen  Tablet 600 milliGRAM(s) Oral every 6 hours PRN pain

## 2018-06-20 NOTE — DISCHARGE NOTE ADULT - PATIENT PORTAL LINK FT
You can access the [facility] Patient Portal, offered by [facility], by registering with the following website: [email or web address]

## 2018-06-21 LAB
CULTURE RESULTS: SIGNIFICANT CHANGE UP
SPECIMEN SOURCE: SIGNIFICANT CHANGE UP

## 2018-06-22 DIAGNOSIS — O99.89 OTHER SPECIFIED DISEASES AND CONDITIONS COMPLICATING PREGNANCY, CHILDBIRTH AND THE PUERPERIUM: ICD-10-CM

## 2018-06-22 DIAGNOSIS — N64.4 MASTODYNIA: ICD-10-CM

## 2018-06-22 DIAGNOSIS — B00.9 HERPESVIRAL INFECTION, UNSPECIFIED: ICD-10-CM

## 2018-06-24 LAB
CULTURE RESULTS: SIGNIFICANT CHANGE UP
SPECIMEN SOURCE: SIGNIFICANT CHANGE UP

## 2019-01-20 PROBLEM — Z00.00 ENCOUNTER FOR PREVENTIVE HEALTH EXAMINATION: Status: ACTIVE | Noted: 2019-01-20

## 2019-02-19 ENCOUNTER — OUTPATIENT (OUTPATIENT)
Dept: OUTPATIENT SERVICES | Facility: HOSPITAL | Age: 24
LOS: 1 days | Discharge: HOME | End: 2019-02-19

## 2019-02-19 ENCOUNTER — APPOINTMENT (OUTPATIENT)
Dept: ANTEPARTUM | Facility: CLINIC | Age: 24
End: 2019-02-19

## 2019-06-22 ENCOUNTER — EMERGENCY (EMERGENCY)
Facility: HOSPITAL | Age: 24
LOS: 0 days | Discharge: HOME | End: 2019-06-22
Attending: EMERGENCY MEDICINE | Admitting: EMERGENCY MEDICINE
Payer: MEDICAID

## 2019-06-22 VITALS
HEART RATE: 90 BPM | TEMPERATURE: 97 F | DIASTOLIC BLOOD PRESSURE: 58 MMHG | RESPIRATION RATE: 18 BRPM | OXYGEN SATURATION: 98 % | SYSTOLIC BLOOD PRESSURE: 107 MMHG

## 2019-06-22 VITALS
OXYGEN SATURATION: 97 % | DIASTOLIC BLOOD PRESSURE: 59 MMHG | HEART RATE: 98 BPM | RESPIRATION RATE: 18 BRPM | TEMPERATURE: 98 F | SYSTOLIC BLOOD PRESSURE: 107 MMHG

## 2019-06-22 VITALS — HEIGHT: 63 IN | WEIGHT: 145.95 LBS

## 2019-06-22 DIAGNOSIS — O99.613 DISEASES OF THE DIGESTIVE SYSTEM COMPLICATING PREGNANCY, THIRD TRIMESTER: ICD-10-CM

## 2019-06-22 DIAGNOSIS — K08.89 OTHER SPECIFIED DISORDERS OF TEETH AND SUPPORTING STRUCTURES: ICD-10-CM

## 2019-06-22 DIAGNOSIS — Z79.2 LONG TERM (CURRENT) USE OF ANTIBIOTICS: ICD-10-CM

## 2019-06-22 DIAGNOSIS — K02.9 DENTAL CARIES, UNSPECIFIED: ICD-10-CM

## 2019-06-22 DIAGNOSIS — Z3A.39 39 WEEKS GESTATION OF PREGNANCY: ICD-10-CM

## 2019-06-22 DIAGNOSIS — Z79.899 OTHER LONG TERM (CURRENT) DRUG THERAPY: ICD-10-CM

## 2019-06-22 DIAGNOSIS — Z79.1 LONG TERM (CURRENT) USE OF NON-STEROIDAL ANTI-INFLAMMATORIES (NSAID): ICD-10-CM

## 2019-06-22 PROCEDURE — 99282 EMERGENCY DEPT VISIT SF MDM: CPT

## 2019-06-22 NOTE — ED PROVIDER NOTE - PROGRESS NOTE DETAILS
dental resident will block pt once more, but do not advise further blocks  pt will see [facility] dentist as sched on mon

## 2019-06-22 NOTE — ED PROVIDER NOTE - NSFOLLOWUPCLINICS_GEN_ALL_ED_FT
[facility]  Dental  [address] [place] [id number]  Phone: [phone]  Fax:   Follow Up Time:

## 2019-06-22 NOTE — ED PROVIDER NOTE - ATTENDING CONTRIBUTION TO CARE
23 yo f currently 37 weeks pregnant, here with recurrent toothache.  pt received a dental block earlier but has worn off.  asking for another block.  pt says she had contacted her dentist and she will address her toothache this week even though she is pregnant. no fever, no chills. 23 yo f currently 39 weeks pregnant, here with recurrent toothache.  pt received a dental block earlier but has worn off.  asking for another block.  pt says she had contacted her dentist and she will address her toothache after she gives birth.  no fever, no chills.  exam: left lower ttp tooth imp: pt with toothache, requesting dental block

## 2019-06-22 NOTE — ED ADULT TRIAGE NOTE - CHIEF COMPLAINT QUOTE
I was here earlier for a toothache, they gave me a shot. I'm 39 weeks and I can't really take anything - patient

## 2019-06-22 NOTE — ED PROVIDER NOTE - OBJECTIVE STATEMENT
pt returns to ED for pain relief  sts was here earlier, had block  cannot take pain anymore since block wore off  pain is sharp, nonradiating, moderate  denies exacerbating or relieving factors  Denies fever/chill/HA/dizziness/chest pain/palpitation/sob/abd pain/n/v/d/ black stool/bloody stool/urinary sxs

## 2019-06-22 NOTE — ED ADULT NURSE NOTE - OBJECTIVE STATEMENT
pt comes in with complaints of right lower dental pain since last night. took 2 500mg tylenol at 5am. still having pain. denies fever. pt is 39 weeks pregnant.

## 2019-06-22 NOTE — ED PROVIDER NOTE - OBJECTIVE STATEMENT
23 y/o female 39 weeks pregnant presents to the ED c/o "I have right lower dental pain s/p filing placement about 1 month ago. The pain is constant and throbbing since yesterday. I've been taking Amoxil and Tylenol with no relief." no fever/ chills/ difficulty swallowing

## 2019-06-22 NOTE — ED PROVIDER NOTE - PHYSICAL EXAMINATION
CONSTITUTIONAL: Well-appearing; well-nourished; in no apparent distress.   ENT: normal nose; no rhinorrhea; normal pharynx with no tonsillar hypertrophy.   NECK: Supple; non-tender; no cervical lymphadenopathy. No JVD.   SKIN: Normal for age and race; warm; dry; good turgor; no apparent lesions or exudate.   NEURO/PSYCH: A & O x 4; grossly unremarkable. mood and manner are appropriate. Grooming and personal hygiene are appropriate. No apparent thoughts of harm to self or others.

## 2019-06-22 NOTE — ED PROVIDER NOTE - CLINICAL SUMMARY MEDICAL DECISION MAKING FREE TEXT BOX
23 yo F with toothache, no abscess, given block by dental resident, follow up with [facility] dentist for furtehr care.

## 2019-06-22 NOTE — ED PROVIDER NOTE - ATTENDING CONTRIBUTION TO CARE
23 yo female  with toothache.  No facial abscess, nml floor of mouth and phonation, no facial cellulitis.  Seen by dental resident and given a block, will follow up with her [facility] dentist.

## 2019-06-22 NOTE — PROGRESS NOTE ADULT - SUBJECTIVE AND OBJECTIVE BOX
Patient presents to ED with chief complaint of lower right side dental pain x 1 day. Patient has temporary filling in place #29, states it was one 1 month ago by dentist.   Patient 39 weeks pregnant   PMH/PSH: Denies     Clinical exam performed. Extraoral exam WNL, no swelling/edema/redness/rash of skin or face. Intraoral exam: dentition/over all hygiene appears fair. No swelling/edema of floor of mouth, no buccal vestibule swelling, no fistula or abscess present.    #29 has large temporary in place. Patient states her dentist said it was large/ near the nerve of the tooth. I explained to patient tooth #29 likely needs root canal, and patient should see her dentist ASA. Patient called dentist, prescribed Amoxicillin and taking Tylenol for pain.   Offered Citanest for temporary pain management.   Administered 2 carpules of plain Citanest via infiltration and inferior alveolar nerve block. Patient reported some relief of symptoms but still had pain to biting. Gave patient information about dental clinic, if unable to follow up with dentist Monday.   Recommendations:   1) Continue Amoxicillin/tylenol as prescribed  2) Follow up with outpatient dentist for further care or follow up with [facility] dental clinic ASAP Monday in unable to get appointment.   3) Return if symptoms do not resolve or worsen, swelling/edema/fever/chills/nausea/vomiting

## 2019-06-23 NOTE — PROGRESS NOTE ADULT - SUBJECTIVE AND OBJECTIVE BOX
Patient presented for second time with dental pain today, requesting block for dental pain.   39 weeks pregnant, currently taking Amoxicillin/ Tylenol   No swelling/edema present intraoral or extraoral. No acute signs of infection noted. No neck pain upon movement, no submandibular swelling or buccal vestibule swelling. No difficulty eating or swallowing.   Patient stated she found a dental office open tomorrow and plans on going to office for evaluation and treatment.   Explained to patient that there is small risk associated with multiple injections, such as abscess/infection, patient still wants block.  Administered 1.5 carpules of plain citanest via inferior alveolar nerve block and infiltration.   Patient reported relief.   Recommendations:   1) continue medications  2) follow up with outpatient dentist tomorrow   3) return if any difficulty breathing/swallowing/fever/nausea/vomit

## 2019-08-27 NOTE — ED ADULT NURSE NOTE - BREATHING, MLM
Your urinalysis is normal.      If you have any other tests that were ordered we will notify you once they are available.  Please let me know if you have any questions concerning this test.  We will discuss further at your next office appointment.    Also please see below for health maintenance items that are due so we may discuss those at your next office visit:    Lipid Panel due on 1988  TETANUS VACCINE due on 05/23/2006    Kamron Vasquez MD  We Offer Telehealth & Same Day Appointments!   Book your Telehealth appointment with me through my nurse or   Clinic appointments on [facility]  [phone]     Check out my Facebook Page and Follow Me at: [email or web address]    Check out my website at [employer] by clicking on: [email or web address]    To Schedule appointments online, go to [employer]: [email or web address] Spontaneous, unlabored and symmetrical
